# Patient Record
Sex: MALE | Race: BLACK OR AFRICAN AMERICAN | NOT HISPANIC OR LATINO | Employment: OTHER
[De-identification: names, ages, dates, MRNs, and addresses within clinical notes are randomized per-mention and may not be internally consistent; named-entity substitution may affect disease eponyms.]

---

## 2019-10-15 ENCOUNTER — RECORDS - HEALTHEAST (OUTPATIENT)
Dept: ADMINISTRATIVE | Facility: OTHER | Age: 74
End: 2019-10-15

## 2019-10-18 ENCOUNTER — ANESTHESIA - HEALTHEAST (OUTPATIENT)
Dept: SURGERY | Facility: HOSPITAL | Age: 74
End: 2019-10-18

## 2020-03-13 ENCOUNTER — RECORDS - HEALTHEAST (OUTPATIENT)
Dept: LAB | Facility: CLINIC | Age: 75
End: 2020-03-13

## 2020-03-13 LAB
ALBUMIN SERPL-MCNC: 3.8 G/DL (ref 3.5–5)
ALP SERPL-CCNC: 64 U/L (ref 45–120)
ALT SERPL W P-5'-P-CCNC: 32 U/L (ref 0–45)
ANION GAP SERPL CALCULATED.3IONS-SCNC: 6 MMOL/L (ref 5–18)
AST SERPL W P-5'-P-CCNC: 18 U/L (ref 0–40)
BILIRUB SERPL-MCNC: 0.3 MG/DL (ref 0–1)
BUN SERPL-MCNC: 21 MG/DL (ref 8–28)
CALCIUM SERPL-MCNC: 8.9 MG/DL (ref 8.5–10.5)
CHLORIDE BLD-SCNC: 105 MMOL/L (ref 98–107)
CHOLEST SERPL-MCNC: 127 MG/DL
CO2 SERPL-SCNC: 29 MMOL/L (ref 22–31)
CREAT SERPL-MCNC: 0.89 MG/DL (ref 0.7–1.3)
FASTING STATUS PATIENT QL REPORTED: NORMAL
GFR SERPL CREATININE-BSD FRML MDRD: >60 ML/MIN/1.73M2
GLUCOSE BLD-MCNC: 87 MG/DL (ref 70–125)
HDLC SERPL-MCNC: 63 MG/DL
LDLC SERPL CALC-MCNC: 58 MG/DL
POTASSIUM BLD-SCNC: 4.1 MMOL/L (ref 3.5–5)
PROT SERPL-MCNC: 6.5 G/DL (ref 6–8)
SODIUM SERPL-SCNC: 140 MMOL/L (ref 136–145)
TRIGL SERPL-MCNC: 31 MG/DL

## 2020-09-19 ENCOUNTER — COMMUNICATION - HEALTHEAST (OUTPATIENT)
Dept: SCHEDULING | Facility: CLINIC | Age: 75
End: 2020-09-19

## 2020-09-21 ENCOUNTER — RECORDS - HEALTHEAST (OUTPATIENT)
Dept: LAB | Facility: CLINIC | Age: 75
End: 2020-09-21

## 2020-09-21 LAB
ERYTHROCYTE [DISTWIDTH] IN BLOOD BY AUTOMATED COUNT: 12.9 % (ref 11–14.5)
HCT VFR BLD AUTO: 45.2 % (ref 40–54)
HGB BLD-MCNC: 15.5 G/DL (ref 14–18)
MCH RBC QN AUTO: 32.4 PG (ref 27–34)
MCHC RBC AUTO-ENTMCNC: 34.3 G/DL (ref 32–36)
MCV RBC AUTO: 95 FL (ref 80–100)
PLATELET # BLD AUTO: 345 THOU/UL (ref 140–440)
PMV BLD AUTO: 9.9 FL (ref 8.5–12.5)
RBC # BLD AUTO: 4.78 MILL/UL (ref 4.4–6.2)
WBC: 5.9 THOU/UL (ref 4–11)

## 2020-09-24 LAB
ALBUMIN SERPL-MCNC: 4.5 G/DL (ref 3.5–5)
ALP SERPL-CCNC: 56 U/L (ref 45–120)
ALT SERPL W P-5'-P-CCNC: 37 U/L (ref 0–45)
ANION GAP SERPL CALCULATED.3IONS-SCNC: 9 MMOL/L (ref 5–18)
AST SERPL W P-5'-P-CCNC: 26 U/L (ref 0–40)
BILIRUB SERPL-MCNC: 0.9 MG/DL (ref 0–1)
BUN SERPL-MCNC: 12 MG/DL (ref 8–28)
CALCIUM SERPL-MCNC: 9.6 MG/DL (ref 8.5–10.5)
CHLORIDE BLD-SCNC: 101 MMOL/L (ref 98–107)
CO2 SERPL-SCNC: 29 MMOL/L (ref 22–31)
CREAT SERPL-MCNC: 1.15 MG/DL (ref 0.7–1.3)
GFR SERPL CREATININE-BSD FRML MDRD: >60 ML/MIN/1.73M2
GLUCOSE BLD-MCNC: 98 MG/DL (ref 70–125)
POTASSIUM BLD-SCNC: 4.1 MMOL/L (ref 3.5–5)
PROT SERPL-MCNC: 7.6 G/DL (ref 6–8)
SODIUM SERPL-SCNC: 139 MMOL/L (ref 136–145)

## 2020-10-21 ENCOUNTER — COMMUNICATION - HEALTHEAST (OUTPATIENT)
Dept: SCHEDULING | Facility: CLINIC | Age: 75
End: 2020-10-21

## 2021-04-19 ENCOUNTER — COMMUNICATION - HEALTHEAST (OUTPATIENT)
Dept: SCHEDULING | Facility: CLINIC | Age: 76
End: 2021-04-19

## 2021-06-16 PROBLEM — E78.5 DYSLIPIDEMIA: Status: ACTIVE | Noted: 2020-01-15

## 2021-06-16 PROBLEM — K59.01 SLOW TRANSIT CONSTIPATION: Status: ACTIVE | Noted: 2019-09-07

## 2021-06-16 PROBLEM — F19.11 HISTORY OF DRUG ABUSE (H): Status: ACTIVE | Noted: 2019-09-06

## 2021-06-16 PROBLEM — K21.9 GASTROESOPHAGEAL REFLUX DISEASE: Status: ACTIVE | Noted: 2020-01-15

## 2021-06-16 PROBLEM — K86.89 DILATED PANCREATIC DUCT: Status: ACTIVE | Noted: 2020-01-15

## 2021-06-16 PROBLEM — R01.1 SYSTOLIC MURMUR: Status: ACTIVE | Noted: 2020-01-15

## 2021-06-16 PROBLEM — R91.1 LUNG NODULE: Status: ACTIVE | Noted: 2020-01-15

## 2021-06-16 PROBLEM — Z87.891 FORMER SMOKER: Status: ACTIVE | Noted: 2020-01-15

## 2021-06-16 PROBLEM — R10.9 ABDOMINAL PAIN: Status: ACTIVE | Noted: 2019-09-06

## 2021-06-16 PROBLEM — R07.9 CHEST PAIN: Status: ACTIVE | Noted: 2019-09-06

## 2021-06-16 PROBLEM — F11.20 METHADONE MAINTENANCE THERAPY PATIENT (H): Status: ACTIVE | Noted: 2020-01-15

## 2021-06-23 ENCOUNTER — HOSPITAL ENCOUNTER (EMERGENCY)
Dept: EMERGENCY MEDICINE | Facility: HOSPITAL | Age: 76
Discharge: HOME | End: 2021-06-24
Attending: EMERGENCY MEDICINE
Payer: COMMERCIAL

## 2021-06-23 DIAGNOSIS — R07.9 CHEST PAIN, UNSPECIFIED TYPE: ICD-10-CM

## 2021-06-23 LAB
ANION GAP SERPL CALCULATED.3IONS-SCNC: 7 MMOL/L (ref 5–18)
BASOPHILS # BLD AUTO: 0 THOU/UL (ref 0–0.2)
BASOPHILS NFR BLD AUTO: 0 % (ref 0–2)
BNP SERPL-MCNC: 64 PG/ML (ref 0–72)
BUN SERPL-MCNC: 12 MG/DL (ref 8–28)
CALCIUM SERPL-MCNC: 9.2 MG/DL (ref 8.5–10.5)
CHLORIDE BLD-SCNC: 102 MMOL/L (ref 98–107)
CO2 SERPL-SCNC: 30 MMOL/L (ref 22–31)
CREAT SERPL-MCNC: 1.05 MG/DL (ref 0.7–1.3)
EOSINOPHIL # BLD AUTO: 0.2 THOU/UL (ref 0–0.4)
EOSINOPHIL NFR BLD AUTO: 3 % (ref 0–6)
ERYTHROCYTE [DISTWIDTH] IN BLOOD BY AUTOMATED COUNT: 13.3 % (ref 11–14.5)
GFR SERPL CREATININE-BSD FRML MDRD: >60 ML/MIN/1.73M2
GLUCOSE BLD-MCNC: 93 MG/DL (ref 70–125)
HCT VFR BLD AUTO: 41.7 % (ref 40–54)
HGB BLD-MCNC: 14.1 G/DL (ref 14–18)
IMM GRANULOCYTES # BLD: 0 THOU/UL
IMM GRANULOCYTES NFR BLD: 0 %
LYMPHOCYTES # BLD AUTO: 3.6 THOU/UL (ref 0.8–4.4)
LYMPHOCYTES NFR BLD AUTO: 50 % (ref 20–40)
MCH RBC QN AUTO: 32 PG (ref 27–34)
MCHC RBC AUTO-ENTMCNC: 33.8 G/DL (ref 32–36)
MCV RBC AUTO: 95 FL (ref 80–100)
MONOCYTES # BLD AUTO: 0.6 THOU/UL (ref 0–0.9)
MONOCYTES NFR BLD AUTO: 9 % (ref 2–10)
NEUTROPHILS # BLD AUTO: 2.7 THOU/UL (ref 2–7.7)
NEUTROPHILS NFR BLD AUTO: 38 % (ref 50–70)
PLATELET # BLD AUTO: 335 THOU/UL (ref 140–440)
PMV BLD AUTO: 9.2 FL (ref 8.5–12.5)
POTASSIUM BLD-SCNC: 3.2 MMOL/L (ref 3.5–5)
RBC # BLD AUTO: 4.41 MILL/UL (ref 4.4–6.2)
SODIUM SERPL-SCNC: 139 MMOL/L (ref 136–145)
TROPONIN I SERPL-MCNC: 0.01 NG/ML (ref 0–0.29)
WBC: 7.2 THOU/UL (ref 4–11)

## 2021-06-24 LAB — TROPONIN I SERPL-MCNC: 0.01 NG/ML (ref 0–0.29)

## 2021-06-25 NOTE — ED TRIAGE NOTES
Patient arrives via EMS for sudden onset CP that started while he was walking to [employer]. EKG for medics was sinus. No pain at this time.

## 2021-06-26 NOTE — ED PROVIDER NOTES
EMERGENCY DEPARTMENT ENCOUNTER      NAME: Pipe Merino  AGE: 75 y.o. male  YOB: 1945  MRN: [id number]  EVALUATION DATE & TIME: 6/23/2021 10:22 PM    PCP: Provider, No Primary Care    ED PROVIDER: Vel Elmore D.O.      Chief Complaint   Patient presents with     Chest Pain         FINAL IMPRESSION:  1. Chest pain, unspecified type          ED COURSE & MEDICAL DECISION MAKING:    10:42 PM I met with the patient to gather history and to perform my initial exam. I discussed the plan for care while in the Emergency Department. PPE worn throughout all patient encounters includes: gloves, safety glasses, N95 mask.  1:59 AM RN reports the patient is requesting ice water.   2:00 AM Repeat troponin negative. I reassessed the patient and discussed the lab and imaging results. Plan for discharge.     Pertinent Labs & Imaging studies reviewed. (See chart for details)  75 y.o. male presents to the Emergency Department for evaluation of intermittent chest pain.  Pain had resolved prior to evaluation in the emergency department.  EKG did not show any evidence of ischemia, however there was one single flipped T waves that reverted back to normal on repeat EKG.  It is possible this could be related to lead placement.  Troponins x2 were both -3 hours apart.  Clinically there is no concern for PE, dissection, pneumothorax, pneumonia, or other emergent process.  He is asymptomatic in the emergency department.  It is possible this is related to his GERD, however based on his age and risk factors, though he is low risk by the heart score, believe that he should still follow-up with cardiology as an outpatient for further management.  Therefore I have referred him to the rapid access clinic.  Patient is agreeable with this plan.  Return precautions discussed.    At the conclusion of the encounter I discussed the results of all of the tests and the disposition. The questions were answered. The patient or family acknowledged  understanding and was agreeable with the care plan.         MEDICATIONS GIVEN IN THE EMERGENCY:  Medications - No data to display    NEW PRESCRIPTIONS STARTED AT TODAY'S ER VISIT  Current Discharge Medication List      CONTINUE these medications which have NOT CHANGED    Details   acetaminophen (TYLENOL) 500 MG tablet Take 1-2 tablets (500-1,000 mg total) by mouth every 4 (four) hours as needed.  Refills: 0    Associated Diagnoses: Chest pain, unspecified type      aspirin 81 MG EC tablet Take 81 mg by mouth daily.      atorvastatin (LIPITOR) 40 MG tablet Take 1 tablet (40 mg total) by mouth at bedtime.  Qty: 30 tablet, Refills: 0    Associated Diagnoses: Essential hypertension, benign      hydroCHLOROthiazide (HYDRODIURIL) 25 MG tablet Take 1.5 tablets (37.5 mg total) by mouth daily.  Qty: 45 tablet, Refills: 0    Associated Diagnoses: Essential hypertension, benign      ibuprofen (ADVIL,MOTRIN) 600 MG tablet Take 1 tablet (600 mg total) by mouth every 6 (six) hours as needed for pain.  Qty: 30 tablet, Refills: 0    Associated Diagnoses: Chronic left shoulder pain      methadone (DOLOPHINE) 10 mg/mL solution Take 48 mg by mouth daily.      polyethylene glycol (MIRALAX) 17 gram packet Take 1 packet (17 g total) by mouth daily. Hold for loose stools.  Refills: 0    Associated Diagnoses: Slow transit constipation                =================================================================    HPI    Patient information was obtained from: Patient    Use of : N/A     Pipe Merino is a 75 y.o. male with a pertinent history of HTN, GERD, drug abuse, who presents to this ED by EMS for evaluation of chest pain.    Patient had just gotten back from the store this evening when he had a sudden onset of a sharp chest pain on the left side of his chest. This resolved, but he had another episode ~15 minutes later. This episode also resolved and he has not had another since, but this concerned him, so he presents to  the ER for evaluation. Patient reports a history of GERD with similar pain that he takes medication for (per chart review, patient takes omeprazole), although he has not taken this in a while. No history of surgery. Patient denies tobacco or alcohol use. Occasional marijuana use. No family cardiac history.     Denies lightheadedness, shortness of breath, nausea, cough, congestion, sore throat, fever, swelling, numbness, tingling, or any additional symptoms at this time.     REVIEW OF SYSTEMS   Constitutional:  Denies fever, chills, weight loss or weakness  Eyes:  No pain, discharge, redness   HENT:  Denies sore throat, ear pain, congestion   Respiratory: No SOB, wheeze or cough  Cardiovascular: Positive for chest pain. No palpitations  GI:  Denies abdominal pain, nausea, vomiting, diarrhea  : Denies dysuria, hematuria  Musculoskeletal:  Denies any new muscle/joint pain, swelling or loss of function.   Skin:  Denies rash, pallor   Neurologic:  Denies headache, focal weakness or sensory changes  Lymph: Denies swollen nodes    All other systems negative unless noted in HPI.    PAST MEDICAL HISTORY:  Past Medical History:   Diagnosis Date     Arthritis      History of drug abuse (H)     Used to snort heroin every other day for the last 5 years. Last heroin use was about 7-8 months ago. Is currently on methadone (48 mg PO daily).      HTN (hypertension)      Vitiligo        PAST SURGICAL HISTORY:  History reviewed. No pertinent surgical history.      CURRENT MEDICATIONS:    No current facility-administered medications on file prior to encounter.      Current Outpatient Medications on File Prior to Encounter   Medication Sig     acetaminophen (TYLENOL) 500 MG tablet Take 1-2 tablets (500-1,000 mg total) by mouth every 4 (four) hours as needed.     aspirin 81 MG EC tablet Take 81 mg by mouth daily.     atorvastatin (LIPITOR) 40 MG tablet Take 1 tablet (40 mg total) by mouth at bedtime.     hydroCHLOROthiazide  (HYDRODIURIL) 25 MG tablet Take 1.5 tablets (37.5 mg total) by mouth daily.     ibuprofen (ADVIL,MOTRIN) 600 MG tablet Take 1 tablet (600 mg total) by mouth every 6 (six) hours as needed for pain.     methadone (DOLOPHINE) 10 mg/mL solution Take 48 mg by mouth daily.     polyethylene glycol (MIRALAX) 17 gram packet Take 1 packet (17 g total) by mouth daily. Hold for loose stools.       ALLERGIES:  No Known Allergies    FAMILY HISTORY:  Family History   Problem Relation Age of Onset     Colon cancer Mother      Heart disease Mother      Hyperlipidemia Mother      Colon cancer Father      Diabetes Brother      Hyperlipidemia Brother      Hypertension Brother        SOCIAL HISTORY:   Social History     Socioeconomic History     Marital status: Single     Spouse name: None     Number of children: None     Years of education: None     Highest education level: None   Occupational History     Occupation: retired   Social Needs     Financial resource strain: None     Food insecurity     Worry: None     Inability: None     Transportation needs     Medical: None     Non-medical: None   Tobacco Use     Smoking status: Former Smoker     Packs/day: 0.50     Years: 15.00     Pack years: 7.50     Quit date: 2019     Years since quittin.9     Smokeless tobacco: Never Used   Substance and Sexual Activity     Alcohol use: Never     Frequency: Never     Drug use: Yes     Frequency: 3.0 times per week     Types: Marijuana     Comment: previous history of heroin use. Patient used to do heroin every other day. The last time he did heroin was 7-8 months ago. He is on methadone daily right now.     Sexual activity: Yes     Partners: Female   Lifestyle     Physical activity     Days per week: None     Minutes per session: None     Stress: None   Relationships     Social connections     Talks on phone: None     Gets together: None     Attends Synagogue service: None     Active member of club or organization: None     Attends meetings  of clubs or organizations: None     Relationship status: None     Intimate partner violence     Fear of current or ex partner: None     Emotionally abused: None     Physically abused: None     Forced sexual activity: None   Other Topics Concern     None   Social History Narrative    Patient currently lives alone. He lives in senior [facility] [place]. He does not use any assistive device for walking. Has no supplemental 02 requirement.        VITALS:  Patient Vitals for the past 24 hrs:   BP Temp Temp src Pulse Resp SpO2 Weight   06/24/21 0130 (!) 140/93 -- -- (!) 49 13 99 % --   06/24/21 0115 (!) 132/91 -- -- (!) 52 17 94 % --   06/24/21 0100 131/87 -- -- (!) 47 15 95 % --   06/24/21 0045 (!) 144/93 -- -- (!) 43 -- 98 % --   06/24/21 0000 131/68 -- -- (!) 54 21 97 % --   06/23/21 2345 152/75 -- -- 62 10 99 % --   06/23/21 2335 162/90 -- -- 61 24 97 % --   06/23/21 2315 (!) 139/98 -- -- (!) 52 16 97 % --   06/23/21 2300 (!) 158/106 -- -- (!) 57 16 94 % --   06/23/21 2224 (!) 168/107 98.5  F (36.9  C) Oral 72 22 96 % (!) 280 lb (127 kg)   06/23/21 2223 -- -- -- 67 -- 96 % --       PHYSICAL EXAM    VITAL SIGNS: BP (!) 140/93   Pulse (!) 49   Temp 98.5  F (36.9  C) (Oral)   Resp 13   Wt (!) 280 lb (127 kg)   SpO2 99%   BMI 35.95 kg/m    General Appearance: Well-appearing, well-nourished, no acute distress   Head:  Normocephalic, without obvious abnormality, atraumatic  Eyes:  PERRL, conjunctiva/corneas clear, EOM's intact,  ENT:  Lips, mucosa, and tongue normal, membranes are moist without pallor  Neck:  Normal ROM, symmetrical, trachea midline    Cardio:  Regular rate and rhythm, no murmur, rub or gallop, 2+ pulses symmetric in all extremities  Pulm:  Clear to auscultation bilaterally, respirations unlabored,   Abdomen:  Soft, non-tender, no rebound or guarding.  Musculoskeletal: Full ROM, no edema, no cyanosis, good ROM of major joints  Integument:  Warm, Dry, No erythema, No rash.    Neurologic:  Alert &  oriented.  No focal deficits appreciated.  Ambulatory.  Psychiatric:  Affect normal, Judgment normal, Mood normal.        Heart Score for Chest Pain Patients   History Highly Suspicious 2 0    Moderately Suspicious 1     Slightly Suspicious 0    EKG Significant ST depression 2 0    Nonspecific Repolarization 1     Normal 0    Age >65 years 2 2    45 - 65 years 1     <45 years 0    Risk Factors 3 or more risk factors 2 1    1-2 risk factors 1     No known risk factors 0    Troponin >3x normal 2 0    >1 - <3x normal 1     Normal limit 0    Total 3     *Risk factors for atherosclerotic disease:   Hypercholesterolemia, Hypertension, DM, Cigarette smoking, Family History, Obesity  * Significant ST depression defined as changes of 1mm or greater. T wave inversions and ST depression of 0.5mm considered nonspecific       LAB:  All pertinent labs reviewed and interpreted.  Results for orders placed or performed during the hospital encounter of 06/23/21   Basic Metabolic Panel   Result Value Ref Range    Sodium 139 136 - 145 mmol/L    Potassium 3.2 (L) 3.5 - 5.0 mmol/L    Chloride 102 98 - 107 mmol/L    CO2 30 22 - 31 mmol/L    Anion Gap, Calculation 7 5 - 18 mmol/L    Glucose 93 70 - 125 mg/dL    Calcium 9.2 8.5 - 10.5 mg/dL    BUN 12 8 - 28 mg/dL    Creatinine 1.05 0.70 - 1.30 mg/dL    GFR MDRD Af Amer >60 >60 mL/min/1.73m2    GFR MDRD Non Af Amer >60 >60 mL/min/1.73m2   Troponin I   Result Value Ref Range    Troponin I 0.01 0.00 - 0.29 ng/mL   BNP(B-type Natriuretic Peptide)   Result Value Ref Range    BNP 64 0 - 72 pg/mL   HM1 (CBC with Diff)   Result Value Ref Range    WBC 7.2 4.0 - 11.0 thou/uL    RBC 4.41 4.40 - 6.20 mill/uL    Hemoglobin 14.1 14.0 - 18.0 g/dL    Hematocrit 41.7 40.0 - 54.0 %    MCV 95 80 - 100 fL    MCH 32.0 27.0 - 34.0 pg    MCHC 33.8 32.0 - 36.0 g/dL    RDW 13.3 11.0 - 14.5 %    Platelets 335 140 - 440 thou/uL    MPV 9.2 8.5 - 12.5 fL    Neutrophils % 38 (L) 50 - 70 %    Lymphocytes % 50 (H) 20 - 40  %    Monocytes % 9 2 - 10 %    Eosinophils % 3 0 - 6 %    Basophils % 0 0 - 2 %    Immature Granulocyte % 0 <=0 %    Neutrophils Absolute 2.7 2.0 - 7.7 thou/uL    Lymphocytes Absolute 3.6 0.8 - 4.4 thou/uL    Monocytes Absolute 0.6 0.0 - 0.9 thou/uL    Eosinophils Absolute 0.2 0.0 - 0.4 thou/uL    Basophils Absolute 0.0 0.0 - 0.2 thou/uL    Immature Granulocyte Absolute 0.0 <=0.0 thou/uL   Troponin I   Result Value Ref Range    Troponin I 0.01 0.00 - 0.29 ng/mL       RADIOLOGY:  Reviewed all pertinent imaging. Please see official radiology report.  Xr Chest 1 View Portable    Result Date: 6/23/2021  EXAM: XR CHEST 1 VIEW PORTABLE LOCATION: [facility] DATE/TIME: 6/23/2021 11:01 PM INDICATION: Chest pain. COMPARISON: 4/18/2021. FINDINGS: The heart size is normal. Thoracic aorta is mildly tortuous. The lungs are clear. No pneumothorax. Arthritis in the shoulders.     No acute abnormality.      EKG:    Rate: 75 bpm  Rhythm: Normal Sinus Rhythm with PVCs  Axis: Left  Interval: Normal  Conduction: Normal  QRS: Normal  ST: Normal  T-wave: Inverted in aVL  QT: Not prolonged  Comparison EKG: Other than single T wave inversion, no significant change compared to 20 December 2019  Impression:  No acute ischemic change   I have independently reviewed and interpreted today's EKG, pending Cardiologist read    REPEAT EKG  Rate: 52 bpm  Rhythm: Normal Sinus Rhythm  Axis: Left  Interval: Normal  Conduction: Normal  QRS: Normal  ST: Normal  T-wave: Normal  QT: Not prolonged  Comparison EKG: T wave inversion has reversed, otherwise no significant change compared to previous  Impression:  No acute ischemic change   I have independently reviewed and interpreted today's EKG, pending Cardiologist read          I, Mayelin Manriquez, am serving as a scribe to document services personally performed by Dr. Vel Elmore based on my observation and the provider's statements to me. I, Vel Elmore, DO attest that Mayelin  Mitra is acting in a scribe capacity, has observed my performance of the services and has documented them in accordance with my direction.    Vel Elmore D.O.  Emergency Medicine  [facility] EMERGENCY DEPARTMENT  [address]  [facility] [address]  Dept: [phone]  Loc: [phone]     Vel Elmore,   06/24/21 0220

## 2021-06-28 ENCOUNTER — OFFICE VISIT - HEALTHEAST (OUTPATIENT)
Dept: CARDIOLOGY | Facility: CLINIC | Age: 76
End: 2021-06-28

## 2021-06-28 DIAGNOSIS — I10 BENIGN ESSENTIAL HYPERTENSION: ICD-10-CM

## 2021-06-28 DIAGNOSIS — K21.9 GASTROESOPHAGEAL REFLUX DISEASE WITHOUT ESOPHAGITIS: ICD-10-CM

## 2021-06-28 DIAGNOSIS — I72.3 ILIAC ARTERY ANEURYSM, BILATERAL (H): ICD-10-CM

## 2021-06-28 DIAGNOSIS — I73.9 PERIPHERAL ARTERIAL DISEASE (H): ICD-10-CM

## 2021-06-28 DIAGNOSIS — E78.5 HYPERLIPIDEMIA, UNSPECIFIED HYPERLIPIDEMIA TYPE: ICD-10-CM

## 2021-06-28 DIAGNOSIS — R07.9 CHEST PAIN, UNSPECIFIED TYPE: ICD-10-CM

## 2021-06-28 LAB
ATRIAL RATE - MUSE: 52 BPM
ATRIAL RATE - MUSE: 75 BPM
DIASTOLIC BLOOD PRESSURE - MUSE: 107 MMHG
DIASTOLIC BLOOD PRESSURE - MUSE: NORMAL
INTERPRETATION ECG - MUSE: NORMAL
INTERPRETATION ECG - MUSE: NORMAL
P AXIS - MUSE: 40 DEGREES
P AXIS - MUSE: 71 DEGREES
PR INTERVAL - MUSE: 190 MS
PR INTERVAL - MUSE: 208 MS
QRS DURATION - MUSE: 124 MS
QRS DURATION - MUSE: 132 MS
QT - MUSE: 450 MS
QT - MUSE: 486 MS
QTC - MUSE: 451 MS
QTC - MUSE: 502 MS
R AXIS - MUSE: -45 DEGREES
R AXIS - MUSE: -46 DEGREES
SYSTOLIC BLOOD PRESSURE - MUSE: 168 MMHG
SYSTOLIC BLOOD PRESSURE - MUSE: NORMAL
T AXIS - MUSE: 16 DEGREES
T AXIS - MUSE: 73 DEGREES
VENTRICULAR RATE- MUSE: 52 BPM
VENTRICULAR RATE- MUSE: 75 BPM

## 2021-06-28 RX ORDER — TIZANIDINE 2 MG/1
2 TABLET ORAL 2 TIMES DAILY PRN
Status: SHIPPED | COMMUNITY
Start: 2021-05-03

## 2021-06-28 ASSESSMENT — MIFFLIN-ST. JEOR: SCORE: 1944.21

## 2021-07-01 ENCOUNTER — RECORDS - HEALTHEAST (OUTPATIENT)
Dept: LAB | Facility: CLINIC | Age: 76
End: 2021-07-01

## 2021-07-01 LAB
ALBUMIN SERPL-MCNC: 4.5 G/DL (ref 3.5–5)
ALP SERPL-CCNC: 58 U/L (ref 45–120)
ALT SERPL W P-5'-P-CCNC: 21 U/L (ref 0–45)
ANION GAP SERPL CALCULATED.3IONS-SCNC: 11 MMOL/L (ref 5–18)
AST SERPL W P-5'-P-CCNC: 21 U/L (ref 0–40)
BILIRUB SERPL-MCNC: 0.6 MG/DL (ref 0–1)
BUN SERPL-MCNC: 12 MG/DL (ref 8–28)
CALCIUM SERPL-MCNC: 9.5 MG/DL (ref 8.5–10.5)
CHLORIDE BLD-SCNC: 102 MMOL/L (ref 98–107)
CHOLEST SERPL-MCNC: 122 MG/DL
CO2 SERPL-SCNC: 28 MMOL/L (ref 22–31)
CREAT SERPL-MCNC: 1.07 MG/DL (ref 0.7–1.3)
FASTING STATUS PATIENT QL REPORTED: NORMAL
GFR SERPL CREATININE-BSD FRML MDRD: >60 ML/MIN/1.73M2
GLUCOSE BLD-MCNC: 89 MG/DL (ref 70–125)
HDLC SERPL-MCNC: 43 MG/DL
LDLC SERPL CALC-MCNC: 66 MG/DL
POTASSIUM BLD-SCNC: 4.2 MMOL/L (ref 3.5–5)
PROT SERPL-MCNC: 7.5 G/DL (ref 6–8)
SODIUM SERPL-SCNC: 141 MMOL/L (ref 136–145)
TRIGL SERPL-MCNC: 66 MG/DL

## 2021-07-06 ENCOUNTER — HOSPITAL ENCOUNTER (OUTPATIENT)
Dept: NUCLEAR MEDICINE | Facility: HOSPITAL | Age: 76
Discharge: HOME | End: 2021-07-06
Attending: GENERAL ACUTE CARE HOSPITAL
Payer: COMMERCIAL

## 2021-07-06 ENCOUNTER — HOSPITAL ENCOUNTER (OUTPATIENT)
Dept: CARDIOLOGY | Facility: HOSPITAL | Age: 76
Discharge: HOME | End: 2021-07-06
Attending: GENERAL ACUTE CARE HOSPITAL
Payer: COMMERCIAL

## 2021-07-06 VITALS — WEIGHT: 280 LBS | BODY MASS INDEX: 35.95 KG/M2

## 2021-07-06 VITALS
HEIGHT: 74 IN | BODY MASS INDEX: 31.95 KG/M2 | RESPIRATION RATE: 18 BRPM | DIASTOLIC BLOOD PRESSURE: 80 MMHG | HEART RATE: 68 BPM | SYSTOLIC BLOOD PRESSURE: 130 MMHG | WEIGHT: 249 LBS

## 2021-07-06 DIAGNOSIS — R07.9 CHEST PAIN, UNSPECIFIED TYPE: ICD-10-CM

## 2021-07-06 LAB
CV STRESS CURRENT BP HE: NORMAL
CV STRESS CURRENT HR HE: 101
CV STRESS CURRENT HR HE: 109
CV STRESS CURRENT HR HE: 110
CV STRESS CURRENT HR HE: 129
CV STRESS CURRENT HR HE: 130
CV STRESS CURRENT HR HE: 131
CV STRESS CURRENT HR HE: 136
CV STRESS CURRENT HR HE: 137
CV STRESS CURRENT HR HE: 138
CV STRESS CURRENT HR HE: 138
CV STRESS CURRENT HR HE: 140
CV STRESS CURRENT HR HE: 141
CV STRESS CURRENT HR HE: 141
CV STRESS CURRENT HR HE: 61
CV STRESS CURRENT HR HE: 73
CV STRESS CURRENT HR HE: 73
CV STRESS CURRENT HR HE: 74
CV STRESS CURRENT HR HE: 74
CV STRESS CURRENT HR HE: 75
CV STRESS CURRENT HR HE: 78
CV STRESS CURRENT HR HE: 79
CV STRESS CURRENT HR HE: 84
CV STRESS DEVIATION TIME HE: NORMAL
CV STRESS ECHO PERCENT HR HE: NORMAL
CV STRESS EXERCISE STAGE HE: NORMAL
CV STRESS EXERCISE STAGE REACHED HE: NORMAL
CV STRESS FINAL RESTING BP HE: NORMAL
CV STRESS FINAL RESTING HR HE: 73
CV STRESS MAX HR HE: 141
CV STRESS MAX TREADMILL GRADE HE: 12
CV STRESS MAX TREADMILL SPEED HE: 2.5
CV STRESS PEAK DIA BP HE: NORMAL
CV STRESS PEAK SYS BP HE: NORMAL
CV STRESS PHASE HE: NORMAL
CV STRESS PROTOCOL HE: NORMAL
CV STRESS RESTING PT POSITION HE: NORMAL
CV STRESS RESTING PT POSITION HE: NORMAL
CV STRESS ST DEVIATION AMOUNT HE: NORMAL
CV STRESS ST DEVIATION ELEVATION HE: NORMAL
CV STRESS ST EVELATION AMOUNT HE: NORMAL
CV STRESS TEST TYPE HE: NORMAL
CV STRESS TOTAL STAGE TIME MIN 1 HE: NORMAL
NUC STRESS EJECTION FRACTION: 72 %
RATE PRESSURE PRODUCT: NORMAL
STRESS ECHO BASELINE DIASTOLIC HE: 64
STRESS ECHO BASELINE HR: 59
STRESS ECHO BASELINE SYSTOLIC BP: 124
STRESS ECHO CALCULATED PERCENT HR: 96 %
STRESS ECHO LAST STRESS DIASTOLIC BP: 80
STRESS ECHO LAST STRESS HR: 141
STRESS ECHO LAST STRESS SYSTOLIC BP: 164
STRESS ECHO POST ESTIMATED WORKLOAD: 5.8
STRESS ECHO POST EXERCISE DUR MIN: 4
STRESS ECHO POST EXERCISE DUR SEC: 0
STRESS ECHO TARGET HR: 147

## 2021-07-07 NOTE — PATIENT INSTRUCTIONS - HE
Patient Instructions by Baldemar Wills MD at 6/28/2021 10:50 AM     Author: Baldemar Wills MD Service: -- Author Type: Physician    Filed: 6/28/2021 11:27 AM Encounter Date: 6/28/2021 Status: Signed    : Baldemar Wills MD (Physician)       Patient Education     Discharge Instructions for Gastroesophageal Reflux Disease (GERD)  Gastroesophageal reflux disease (GERD) is a backflow of acid from the stomach into the swallowing tube (esophagus).  Home care  These home care steps can help you manage GERD:    Maintain a healthy weight. Get help to lose any extra pounds.    Avoid lying down after meals.    Avoid eating late at night.    Elevate the head of your bed by 6 inches. You can do this by placing wooden blocks or bed risers under the head of your bed.    Avoid wearing tight-fitting clothes.    Avoid foods that might irritate your stomach, such as the following:  ? Alcohol  ? Fat  ? Chocolate  ? Caffeine  ? Spearmint or peppermint    Talk to your healthcare provider if you are taking any of the following medicines. These medicines can make GERD symptoms worse:  ? Calcium channel blockers  ? Theophylline  ? Anticholinergic medicines, such as oxybutynin and benzatropine    Begin an exercise program. Ask your healthcare provider how to get started. You can benefit from simple activities, such as walking or gardening.    Break the smoking habit. Enroll in a stop-smoking program to improve your chances of success.    Limit alcohol intake to no more than 2 drinks a day.    Take your medicines exactly as directed. Dont skip doses.    Avoid over-the-counter nonsteroidal anti-inflammatory medicines, such as aspirin and ibuprofen, unless recommended by your healthcare provider for certain conditions.     If possible, avoid nitrates (heart medicines, such as nitroglycerin and isosorbide dinitrate ).  Follow-up care  Make a follow-up appointment as directed by our staff.     When to call the healthcare  provider  Call your healthcare provider immediately if you have any of the following:    Trouble swallowing    Pain when swallowing    Feeling of food caught in your chest or throat    Pain in the neck, chest, or back    Heartburn that causes you to vomit    Vomiting blood    Black or tarry stools (from digested blood)    More saliva (watering of the mouth) than usual    Weight loss of more than 3% to 5% of your total body weight in a month    Hoarseness or sore throat that wont go away    Choking, coughing, or wheezing   Date Last Reviewed: 7/1/2016 2000-2019 [employer]. [address] [place] [id number]. All rights reserved. This information is not intended as a substitute for professional medical care. Always follow your healthcare professional's instructions.

## 2021-07-07 NOTE — PROGRESS NOTES
Progress Notes by Baldemar Wills MD at 6/28/2021 10:50 AM     Author: Baldemar Wills MD Service: -- Author Type: Physician    Filed: 6/28/2021 11:51 AM Encounter Date: 6/28/2021 Status: Signed    : Baldemar Wills MD (Physician)           Thank you, Dr. Vel Elmore, for asking the [facility] team to see Mr. Pipe Merino to evaluate chest pain.      Assessment/Recommendations   Assessment:    1. Chest pain. Seems atypical and may be due to acid reflux, but he does have several risk factors for coronary artery disease.  2. Peripheral arterial disease with bilateral fusiform common iliac artery aneurysms, measuring 2.0 cm on the right and 2.2 cm on the left. Low risk for rupture with current small size.  3. Benign essential hypertension.  4. Hyperlipidemia.  5. Former smoker.    Plan:  1. Exercise nuclear stress test.  2. Follow-up iliac artery aneurysms with CT angiogram aorta and lower extremity runoff. Given small aneurysm size and the fact that he quit smoking, if his aneurysms are stable and he is not having lower extremity symptoms, these probably do not need serial monitoring.  3. Follow-up as needed.         History of Present Illness   Mr. Pipe Merino is a 75 y.o. male with a significant past history of HTN and former smoker presenting for urgent evaluation of chest pain. He was seen in the ED for this on 6/23/2021. For the past couple years, he has had intermittent tightness across his chest. It became more frequent prompting his visit to the ED. It happens at rest and seems associated with eating greasy or spicy foods. He has attributed this to GERD in the past, and when he uses omeprazole, his symptoms are better. ECG in the ED showed no acute changes. Troponins and BNP were normal.    He does a lot of walking, and goes up 30 steps at his home most days. Sometimes he gets a little out of breath with this and possibly has had chest tightness with this even thought  usually he feels fine gong up the stairs. He gets a bit dizzy occasionally, but no pre-syncope or syncope. No shortness of breath at rest, lower extremity swelling, palpitations, paroxysmal nocturnal dyspnea (PND), or orthopnea.    On a CT scan in 2019, he was incidentally noted to have bilateral fusiform common iliac artery aneurysms. He does not recall being told this. He denies any claudication or groin pain.     Cardiac Problems and Cardiac Diagnostics     Most Recent Cardiac testing:  ECG dated 6/24/2021 (personaly reviewed and interpreted): SR, left anterior fascicular block     ECHO (report reviewed):   Echo results:   Results for orders placed during the hospital encounter of 12/28/19   Echo Complete [ECH10] 12/28/2019    Narrative   When compared to the previous study dated 9/6/2019, mild hypertrophy is   now present. Otherwise, there have been no significant interval changes    Left ventricle ejection fraction is The calculated left ventricular   ejection fraction is 63%.    Normal left ventricular size.    Normal right ventricular size and systolic function.    No significant valvular abnormalities noted    Mild hypertrophy noted          CTA chest/abdomen/pelvis 9/6/2019 (report reviewed):   1.  No evidence of thoracoabdominal aortic aneurysm or dissection.  2.  Bilateral fusiform common iliac artery aneurysms, measuring 2.0 cm on the right and 2.2 cm on the left.  3.  Dilated common bile duct and pancreatic duct, with prominent ampulla. Ampullary mass should be excluded. Consider direct visualization or MRCP.  4.  Small right lower lobe pulmonary nodule. Comparison with prior studies is recommended. In a high-risk patient, follow-up CT recommended in 12 months.  5.  Additional findings as detailed above.     Medications  Allergies   Current Outpatient Medications   Medication Sig Dispense Refill   ? acetaminophen (TYLENOL) 500 MG tablet Take 1-2 tablets (500-1,000 mg total) by mouth every 4 (four) hours  as needed.  0   ? aspirin 81 MG EC tablet Take 81 mg by mouth daily.     ? atorvastatin (LIPITOR) 40 MG tablet Take 1 tablet (40 mg total) by mouth at bedtime. 30 tablet 0   ? ibuprofen (ADVIL,MOTRIN) 600 MG tablet Take 1 tablet (600 mg total) by mouth every 6 (six) hours as needed for pain. 30 tablet 0   ? omeprazole (PRILOSEC) 20 MG capsule Take 20 mg by mouth 2 (two) times a day before meals.     ? tiZANidine (ZANAFLEX) 2 MG tablet Take 2 mg by mouth 2 (two) times a day as needed.     ? hydroCHLOROthiazide (HYDRODIURIL) 25 MG tablet Take 1.5 tablets (37.5 mg total) by mouth daily. 45 tablet 0   ? methadone (DOLOPHINE) 10 mg/mL solution Take 48 mg by mouth daily.     ? polyethylene glycol (MIRALAX) 17 gram packet Take 1 packet (17 g total) by mouth daily. Hold for loose stools.  0     No current facility-administered medications for this visit.       No Known Allergies     Physical Examination Review of Systems   Vitals:    06/28/21 1054   BP: 130/80   Pulse: 68   Resp: 18     Body mass index is 31.97 kg/m .  Wt Readings from Last 3 Encounters:   06/28/21 (!) 249 lb (112.9 kg)   06/23/21 (!) 280 lb (127 kg)   04/18/21 (!) 240 lb (108.9 kg)       General Appearance:   Pleasant  male, appears  stated age. no acute distress, obese body habitus   ENT/Mouth: membranes moist, no apparent gingival bleeding, poor dentition   EYES:  no scleral icterus, normal conjunctivae   Neck: no carotid bruits. No anterior cervical lymphadenopaty   Respiratory:   lungs are clear to auscultation, no rales or wheezing, equal chest wall expansion    Cardiovascular:   Regular rhythm, normal rate. Normal first and second heart sounds with no murmurs, rubs, or gallops; the carotid, radial and posterior tibial pulses are intact, Jugular venous pressure normal, no edema bilaterally    Abdomen/GI:  no organomegaly, masses, bruits, or tenderness; bowel sounds are present   Extremities: no cyanosis or clubbing   Skin: no xanthelasma, warm.     Heme/lymph/ Immunology No apparent bleeding noted.   Neurologic: Alert and oriented. normal gait, no tremors     Psychiatric: Pleasant, calm, appropriate affect.    A complete 10 system review of systems was performed and is negative except as mentioned in the HPI or below:  General: WNL  Eyes: WNL  Ears/Nose/Throat: WNL  Lungs: Cough, Wheezing  Heart: Chest Pain, Arm Pain  Stomach: Constipation, Heartburn  Bladder: WNL  Muscle/Joints: WNL  Skin: WNL  Nervous System: Dizziness  Mental Health: WNL     Blood: WNL       Past History   Past Medical History:   Past Medical History:   Diagnosis Date   ? Arthritis    ? History of drug abuse (H)     Used to snort heroin every other day for the last 5 years. Last heroin use was about 7-8 months ago. Is currently on methadone (48 mg PO daily).    ? HTN (hypertension)    ? Vitiligo        Past Surgical History:   No documented history of prior surgery.    Family History:   Family History   Problem Relation Age of Onset   ? Colon cancer Mother    ? Heart disease Mother    ? Hyperlipidemia Mother    ? Colon cancer Father    ? Diabetes Brother    ? Hyperlipidemia Brother    ? Hypertension Brother        Social History:   Social History     Socioeconomic History   ? Marital status: Single     Spouse name: Not on file   ? Number of children: Not on file   ? Years of education: Not on file   ? Highest education level: Not on file   Occupational History   ? Occupation: retired   Social Needs   ? Financial resource strain: Not on file   ? Food insecurity     Worry: Not on file     Inability: Not on file   ? Transportation needs     Medical: Not on file     Non-medical: Not on file   Tobacco Use   ? Smoking status: Former Smoker     Packs/day: 0.50     Years: 15.00     Pack years: 7.50     Quit date: 2019     Years since quittin.9   ? Smokeless tobacco: Never Used   Substance and Sexual Activity   ? Alcohol use: Never     Frequency: Never   ? Drug use: Yes     Frequency: 3.0  times per week     Types: Marijuana     Comment: previous history of heroin use. Patient used to do heroin every other day. The last time he did heroin was 7-8 months ago. He is on methadone daily right now.   ? Sexual activity: Yes     Partners: Female   Lifestyle   ? Physical activity     Days per week: Not on file     Minutes per session: Not on file   ? Stress: Not on file   Relationships   ? Social connections     Talks on phone: Not on file     Gets together: Not on file     Attends [employer] service: Not on file     Active member of club or organization: Not on file     Attends meetings of clubs or organizations: Not on file     Relationship status: Not on file   ? Intimate partner violence     Fear of current or ex partner: Not on file     Emotionally abused: Not on file     Physically abused: Not on file     Forced sexual activity: Not on file   Other Topics Concern   ? Not on file   Social History Narrative    Patient currently lives alone. He lives in senior [facility]. He does not use any assistive device for walking. Has no supplemental 02 requirement.               Lab Results    Chemistry/lipid CBC Cardiac Enzymes/BNP/TSH/INR   Lab Results   Component Value Date    CHOL 127 03/13/2020    HDL 63 03/13/2020    LDLCALC 58 03/13/2020    TRIG 31 03/13/2020    CREATININE 1.05 06/23/2021    BUN 12 06/23/2021    K 3.2 (L) 06/23/2021     06/23/2021     06/23/2021    CO2 30 06/23/2021    Lab Results   Component Value Date    WBC 7.2 06/23/2021    HGB 14.1 06/23/2021    HCT 41.7 06/23/2021    MCV 95 06/23/2021     06/23/2021    Lab Results   Component Value Date    CKMB 7 06/04/2019    TROPONINI 0.01 06/24/2021    BNP 64 06/23/2021    TSH 0.53 12/28/2019    INR 0.98 09/06/2019          Baldemar Wills MD [facility]  Non-Invasive Cardiologist  [facility]  Pager [phone]

## 2022-01-27 ENCOUNTER — LAB REQUISITION (OUTPATIENT)
Dept: LAB | Facility: CLINIC | Age: 77
End: 2022-01-27

## 2022-01-27 DIAGNOSIS — Z12.5 ENCOUNTER FOR SCREENING FOR MALIGNANT NEOPLASM OF PROSTATE: ICD-10-CM

## 2022-01-27 LAB — PSA SERPL-MCNC: 0.67 UG/L (ref 0–6.5)

## 2022-01-27 PROCEDURE — G0103 PSA SCREENING: HCPCS | Performed by: FAMILY MEDICINE

## 2022-04-22 ENCOUNTER — LAB REQUISITION (OUTPATIENT)
Dept: LAB | Facility: CLINIC | Age: 77
End: 2022-04-22

## 2022-04-22 DIAGNOSIS — I10 ESSENTIAL (PRIMARY) HYPERTENSION: ICD-10-CM

## 2022-04-22 PROCEDURE — 80069 RENAL FUNCTION PANEL: CPT | Performed by: FAMILY MEDICINE

## 2022-04-23 LAB
ALBUMIN SERPL-MCNC: 4.5 G/DL (ref 3.5–5)
ANION GAP SERPL CALCULATED.3IONS-SCNC: 11 MMOL/L (ref 5–18)
BUN SERPL-MCNC: 19 MG/DL (ref 8–28)
CALCIUM SERPL-MCNC: 9.5 MG/DL (ref 8.5–10.5)
CHLORIDE BLD-SCNC: 99 MMOL/L (ref 98–107)
CO2 SERPL-SCNC: 29 MMOL/L (ref 22–31)
CREAT SERPL-MCNC: 1.24 MG/DL (ref 0.7–1.3)
GFR SERPL CREATININE-BSD FRML MDRD: 61 ML/MIN/1.73M2
GLUCOSE BLD-MCNC: 98 MG/DL (ref 70–125)
PHOSPHATE SERPL-MCNC: 3.4 MG/DL (ref 2.5–4.5)
POTASSIUM BLD-SCNC: 4.1 MMOL/L (ref 3.5–5)
SODIUM SERPL-SCNC: 139 MMOL/L (ref 136–145)

## 2022-11-03 ENCOUNTER — LAB REQUISITION (OUTPATIENT)
Dept: LAB | Facility: CLINIC | Age: 77
End: 2022-11-03

## 2022-11-03 DIAGNOSIS — I10 ESSENTIAL (PRIMARY) HYPERTENSION: ICD-10-CM

## 2022-11-03 LAB
ALBUMIN SERPL BCG-MCNC: 4.5 G/DL (ref 3.5–5.2)
ANION GAP SERPL CALCULATED.3IONS-SCNC: 12 MMOL/L (ref 7–15)
BUN SERPL-MCNC: 16.7 MG/DL (ref 8–23)
CALCIUM SERPL-MCNC: 9.3 MG/DL (ref 8.8–10.2)
CHLORIDE SERPL-SCNC: 99 MMOL/L (ref 98–107)
CREAT SERPL-MCNC: 1.17 MG/DL (ref 0.67–1.17)
DEPRECATED HCO3 PLAS-SCNC: 28 MMOL/L (ref 22–29)
GFR SERPL CREATININE-BSD FRML MDRD: 65 ML/MIN/1.73M2
GLUCOSE SERPL-MCNC: 94 MG/DL (ref 70–99)
PHOSPHATE SERPL-MCNC: 3.1 MG/DL (ref 2.5–4.5)
POTASSIUM SERPL-SCNC: 3.3 MMOL/L (ref 3.4–5.3)
SODIUM SERPL-SCNC: 139 MMOL/L (ref 136–145)

## 2022-11-03 PROCEDURE — 80069 RENAL FUNCTION PANEL: CPT | Performed by: FAMILY MEDICINE

## 2022-11-08 ENCOUNTER — LAB REQUISITION (OUTPATIENT)
Dept: LAB | Facility: CLINIC | Age: 77
End: 2022-11-08

## 2022-11-08 DIAGNOSIS — E87.1 HYPO-OSMOLALITY AND HYPONATREMIA: ICD-10-CM

## 2022-11-08 LAB
ALBUMIN SERPL BCG-MCNC: 4.4 G/DL (ref 3.5–5.2)
ANION GAP SERPL CALCULATED.3IONS-SCNC: 12 MMOL/L (ref 7–15)
BUN SERPL-MCNC: 9.7 MG/DL (ref 8–23)
CALCIUM SERPL-MCNC: 9.7 MG/DL (ref 8.8–10.2)
CHLORIDE SERPL-SCNC: 99 MMOL/L (ref 98–107)
CREAT SERPL-MCNC: 1.15 MG/DL (ref 0.67–1.17)
DEPRECATED HCO3 PLAS-SCNC: 26 MMOL/L (ref 22–29)
GFR SERPL CREATININE-BSD FRML MDRD: 66 ML/MIN/1.73M2
GLUCOSE SERPL-MCNC: 100 MG/DL (ref 70–99)
PHOSPHATE SERPL-MCNC: 2.8 MG/DL (ref 2.5–4.5)
POTASSIUM SERPL-SCNC: 3.8 MMOL/L (ref 3.4–5.3)
SODIUM SERPL-SCNC: 137 MMOL/L (ref 136–145)

## 2022-11-08 PROCEDURE — 80069 RENAL FUNCTION PANEL: CPT | Performed by: FAMILY MEDICINE

## 2022-12-28 ENCOUNTER — LAB REQUISITION (OUTPATIENT)
Dept: LAB | Facility: CLINIC | Age: 77
End: 2022-12-28

## 2022-12-28 DIAGNOSIS — I10 ESSENTIAL (PRIMARY) HYPERTENSION: ICD-10-CM

## 2022-12-28 DIAGNOSIS — E78.2 MIXED HYPERLIPIDEMIA: ICD-10-CM

## 2022-12-28 LAB
ALBUMIN SERPL BCG-MCNC: 4.3 G/DL (ref 3.5–5.2)
ALP SERPL-CCNC: 53 U/L (ref 40–129)
ALT SERPL W P-5'-P-CCNC: 17 U/L (ref 10–50)
ANION GAP SERPL CALCULATED.3IONS-SCNC: 11 MMOL/L (ref 7–15)
AST SERPL W P-5'-P-CCNC: 16 U/L (ref 10–50)
BILIRUB SERPL-MCNC: 0.4 MG/DL
BUN SERPL-MCNC: 14.6 MG/DL (ref 8–23)
CALCIUM SERPL-MCNC: 9.2 MG/DL (ref 8.8–10.2)
CHLORIDE SERPL-SCNC: 105 MMOL/L (ref 98–107)
CHOLEST SERPL-MCNC: 135 MG/DL
CREAT SERPL-MCNC: 1.02 MG/DL (ref 0.67–1.17)
DEPRECATED HCO3 PLAS-SCNC: 25 MMOL/L (ref 22–29)
GFR SERPL CREATININE-BSD FRML MDRD: 77 ML/MIN/1.73M2
GLUCOSE SERPL-MCNC: 96 MG/DL (ref 70–99)
HDLC SERPL-MCNC: 50 MG/DL
LDLC SERPL CALC-MCNC: 68 MG/DL
NONHDLC SERPL-MCNC: 85 MG/DL
POTASSIUM SERPL-SCNC: 4.8 MMOL/L (ref 3.4–5.3)
PROT SERPL-MCNC: 6.8 G/DL (ref 6.4–8.3)
SODIUM SERPL-SCNC: 141 MMOL/L (ref 136–145)
TRIGL SERPL-MCNC: 87 MG/DL

## 2022-12-28 PROCEDURE — 82040 ASSAY OF SERUM ALBUMIN: CPT | Performed by: FAMILY MEDICINE

## 2022-12-28 PROCEDURE — 80061 LIPID PANEL: CPT | Performed by: FAMILY MEDICINE

## 2022-12-28 PROCEDURE — 80053 COMPREHEN METABOLIC PANEL: CPT | Performed by: FAMILY MEDICINE

## 2023-08-02 ENCOUNTER — LAB REQUISITION (OUTPATIENT)
Dept: LAB | Facility: CLINIC | Age: 78
End: 2023-08-02

## 2023-08-02 DIAGNOSIS — I10 ESSENTIAL (PRIMARY) HYPERTENSION: ICD-10-CM

## 2023-08-02 LAB
ALBUMIN SERPL BCG-MCNC: 5 G/DL (ref 3.5–5.2)
ANION GAP SERPL CALCULATED.3IONS-SCNC: 16 MMOL/L (ref 7–15)
BUN SERPL-MCNC: 23.3 MG/DL (ref 8–23)
CALCIUM SERPL-MCNC: 9.8 MG/DL (ref 8.8–10.2)
CHLORIDE SERPL-SCNC: 99 MMOL/L (ref 98–107)
CREAT SERPL-MCNC: 1.28 MG/DL (ref 0.67–1.17)
DEPRECATED HCO3 PLAS-SCNC: 25 MMOL/L (ref 22–29)
GFR SERPL CREATININE-BSD FRML MDRD: 58 ML/MIN/1.73M2
GLUCOSE SERPL-MCNC: 105 MG/DL (ref 70–99)
PHOSPHATE SERPL-MCNC: 3.7 MG/DL (ref 2.5–4.5)
POTASSIUM SERPL-SCNC: 4.4 MMOL/L (ref 3.4–5.3)
SODIUM SERPL-SCNC: 140 MMOL/L (ref 136–145)

## 2023-08-02 PROCEDURE — 82040 ASSAY OF SERUM ALBUMIN: CPT | Performed by: FAMILY MEDICINE

## 2023-08-30 ENCOUNTER — LAB REQUISITION (OUTPATIENT)
Dept: LAB | Facility: CLINIC | Age: 78
End: 2023-08-30

## 2023-08-30 DIAGNOSIS — I10 ESSENTIAL (PRIMARY) HYPERTENSION: ICD-10-CM

## 2023-08-30 LAB
ALBUMIN SERPL BCG-MCNC: 4.6 G/DL (ref 3.5–5.2)
ANION GAP SERPL CALCULATED.3IONS-SCNC: 12 MMOL/L (ref 7–15)
BUN SERPL-MCNC: 14.4 MG/DL (ref 8–23)
CALCIUM SERPL-MCNC: 9.2 MG/DL (ref 8.8–10.2)
CHLORIDE SERPL-SCNC: 102 MMOL/L (ref 98–107)
CREAT SERPL-MCNC: 1.06 MG/DL (ref 0.67–1.17)
DEPRECATED HCO3 PLAS-SCNC: 26 MMOL/L (ref 22–29)
GFR SERPL CREATININE-BSD FRML MDRD: 73 ML/MIN/1.73M2
GLUCOSE SERPL-MCNC: 95 MG/DL (ref 70–99)
PHOSPHATE SERPL-MCNC: 3.1 MG/DL (ref 2.5–4.5)
POTASSIUM SERPL-SCNC: 4.3 MMOL/L (ref 3.4–5.3)
SODIUM SERPL-SCNC: 140 MMOL/L (ref 136–145)

## 2023-08-30 PROCEDURE — 80069 RENAL FUNCTION PANEL: CPT | Performed by: FAMILY MEDICINE

## 2025-02-10 ENCOUNTER — LAB REQUISITION (OUTPATIENT)
Dept: LAB | Facility: CLINIC | Age: 80
End: 2025-02-10

## 2025-02-10 DIAGNOSIS — I10 ESSENTIAL (PRIMARY) HYPERTENSION: ICD-10-CM

## 2025-02-10 DIAGNOSIS — E78.2 MIXED HYPERLIPIDEMIA: ICD-10-CM

## 2025-02-10 LAB
ALBUMIN SERPL BCG-MCNC: 4.4 G/DL (ref 3.5–5.2)
ALP SERPL-CCNC: 56 U/L (ref 40–150)
ALT SERPL W P-5'-P-CCNC: 26 U/L (ref 0–70)
ANION GAP SERPL CALCULATED.3IONS-SCNC: 13 MMOL/L (ref 7–15)
AST SERPL W P-5'-P-CCNC: 23 U/L (ref 0–45)
BILIRUB SERPL-MCNC: 0.4 MG/DL
BUN SERPL-MCNC: 23.6 MG/DL (ref 8–23)
CALCIUM SERPL-MCNC: 9.2 MG/DL (ref 8.8–10.4)
CHLORIDE SERPL-SCNC: 97 MMOL/L (ref 98–107)
CHOLEST SERPL-MCNC: 124 MG/DL
CREAT SERPL-MCNC: 1.2 MG/DL (ref 0.67–1.17)
EGFRCR SERPLBLD CKD-EPI 2021: 62 ML/MIN/1.73M2
FASTING STATUS PATIENT QL REPORTED: ABNORMAL
FASTING STATUS PATIENT QL REPORTED: NORMAL
GLUCOSE SERPL-MCNC: 95 MG/DL (ref 70–99)
HCO3 SERPL-SCNC: 28 MMOL/L (ref 22–29)
HDLC SERPL-MCNC: 54 MG/DL
LDLC SERPL CALC-MCNC: 48 MG/DL
NONHDLC SERPL-MCNC: 70 MG/DL
POTASSIUM SERPL-SCNC: 3.7 MMOL/L (ref 3.4–5.3)
PROT SERPL-MCNC: 7.6 G/DL (ref 6.4–8.3)
SODIUM SERPL-SCNC: 138 MMOL/L (ref 135–145)
TRIGL SERPL-MCNC: 111 MG/DL

## 2025-02-10 PROCEDURE — 82247 BILIRUBIN TOTAL: CPT | Performed by: FAMILY MEDICINE

## 2025-02-10 PROCEDURE — 82310 ASSAY OF CALCIUM: CPT | Performed by: FAMILY MEDICINE

## 2025-02-10 PROCEDURE — 80061 LIPID PANEL: CPT | Performed by: FAMILY MEDICINE
